# Patient Record
Sex: FEMALE | Race: WHITE | URBAN - METROPOLITAN AREA
[De-identification: names, ages, dates, MRNs, and addresses within clinical notes are randomized per-mention and may not be internally consistent; named-entity substitution may affect disease eponyms.]

---

## 2019-08-14 LAB
CREATININE, EXTERNAL: NORMAL
LDL-C, EXTERNAL: NORMAL

## 2019-09-11 LAB — HBA1C MFR BLD HPLC: NORMAL %

## 2019-11-19 ENCOUNTER — OFFICE VISIT (OUTPATIENT)
Dept: ENDOCRINOLOGY | Age: 64
End: 2019-11-19

## 2019-11-19 VITALS
BODY MASS INDEX: 42.63 KG/M2 | WEIGHT: 271.6 LBS | DIASTOLIC BLOOD PRESSURE: 82 MMHG | SYSTOLIC BLOOD PRESSURE: 119 MMHG | HEIGHT: 67 IN | HEART RATE: 128 BPM

## 2019-11-19 DIAGNOSIS — E11.9 TYPE 2 DIABETES MELLITUS WITHOUT COMPLICATION, WITHOUT LONG-TERM CURRENT USE OF INSULIN (HCC): Primary | ICD-10-CM

## 2019-11-19 DIAGNOSIS — I10 ESSENTIAL HYPERTENSION WITH GOAL BLOOD PRESSURE LESS THAN 130/80: ICD-10-CM

## 2019-11-19 DIAGNOSIS — E78.5 HYPERLIPIDEMIA, UNSPECIFIED HYPERLIPIDEMIA TYPE: ICD-10-CM

## 2019-11-19 RX ORDER — ROSUVASTATIN CALCIUM 10 MG/1
TABLET, COATED ORAL
Refills: 1 | COMMUNITY
Start: 2019-10-15

## 2019-11-19 RX ORDER — ASPIRIN 81 MG/1
TABLET ORAL DAILY
COMMUNITY

## 2019-11-19 RX ORDER — INSULIN DEGLUDEC INJECTION 100 U/ML
INJECTION, SOLUTION SUBCUTANEOUS
Refills: 3 | COMMUNITY
Start: 2019-09-11

## 2019-11-19 NOTE — PROGRESS NOTES
CONSULTATION REQUESTED BY: Valentino Nails, RDCS     REASON FOR CONSULT:  Uncontrolled type 2 diabetes    CHIEF COMPLAINT: evaluation of type 2 diabetes    HISTORY OF PRESENT ILLNESS:   Narendra Manrique is a 75 y.o. female with a PMHx as noted below who presents for evaluation of uncontrolled type 2 diabetes. Diabetes History:  Patient was seen in this clinic long ago, never returned,  Diabetes was diagnosed in 2015, started on metformin,  Family History of diabetes is not certain,  Last K2M prior to initial visit was 12.1%,    Current Home Regimen:  - metformin 1000 mg twice per day  - Tresiba 22 units each evening    Review of home glucose:  Not checking her sugars,  Does not recall the last time she checked her sugars,    Review of most recent diabetes-related labs:  Lab Results   Component Value Date    CWY6OJNN 12.1% 09/11/2019    WLV0UCXM 10.6 07/02/2016    LDLCEXT Total Chol 145, LDL 48 09/11/2019    CREATEXT Cr 0.91, GFR 66 09/11/2019    MALBEXT 17.2 07/02/2016     Lab Key:  [id number] = IA-2 pancreatic islet cell autoantibody  CPEPL = C-peptide level  :EXT = External Lab  GADLT = SCOTT-65 autoantibody   INSUL = Insulin level  MCACR (or MALBEXT) = Urine Microalbumin (or External UM)  B12LT = B12 level    PAST MEDICAL/SURGICAL HISTORY:   Past Medical History:   Diagnosis Date    Diabetes ([address]     Hypertension      Past Surgical History:   Procedure Laterality Date    HX BREAST LUMPECTOMY Left     HX HYSTERECTOMY      HX TONSILLECTOMY      IR CHOLECYSTOSTOMY PERCUTANEOUS         ALLERGIES:   Allergies   Allergen Reactions    Codeine Other (comments)     Tongue numbness    Oxycodone Other (comments)       MEDICATIONS ON ADMISSION:     Current Outpatient Medications:     TRESIBA FLEXTOUCH U-100 100 unit/mL (3 mL) inpn, INJECT 22 UNITS SUBCUTANEOUS DAILY. , Disp: , Rfl: 3    rosuvastatin (CRESTOR) 10 mg tablet, TAKE 1 TABLET BY MOUTH EVERYDAY AT BEDTIME, Disp: , Rfl: 1    aspirin delayed-release 81 mg tablet, Take  by mouth daily. , Disp: , Rfl:     metFORMIN (GLUCOPHAGE) 1,000 mg tablet, TAKE 1 TABLET BY MOUTH TWICE A DAY, Disp: , Rfl: 1    BENICAR HCT 40-25 mg per tablet, TAKE 1 TABLET EVERY DAY, Disp: , Rfl: 1    CHOLECALCIFEROL, VITAMIN D3, (VITAMIN D3 PO), Take 1,000 Units by mouth daily. , Disp: , Rfl:     SOCIAL HISTORY:   Social History     Socioeconomic History    Marital status: UNKNOWN     Spouse name: Not on file    Number of children: Not on file    Years of education: Not on file    Highest education level: Not on file   Occupational History    Not on file   Social Needs    Financial resource strain: Not on file    Food insecurity:     Worry: Not on file     Inability: Not on file    Transportation needs:     Medical: Not on file     Non-medical: Not on file   Tobacco Use    Smoking status: Never Smoker    Smokeless tobacco: Never Used   Substance and Sexual Activity    Alcohol use: Never     Frequency: Never    Drug use: Not on file    Sexual activity: Not on file   Lifestyle    Physical activity:     Days per week: Not on file     Minutes per session: Not on file    Stress: Not on file   Relationships    Social connections:     Talks on phone: Not on file     Gets together: Not on file     Attends Alevism service: Not on file     Active member of club or organization: Not on file     Attends meetings of clubs or organizations: Not on file     Relationship status: Not on file    Intimate partner violence:     Fear of current or ex partner: Not on file     Emotionally abused: Not on file     Physically abused: Not on file     Forced sexual activity: Not on file   Other Topics Concern    Not on file   Social History Narrative    Not on file       FAMILY HISTORY:  Family History   Problem Relation Age of Onset    Kidney Disease Mother     Heart Disease Mother     No Known Problems Sister         half sister    Diabetes Brother     Heart Attack Brother     Stroke Brother  Stroke Maternal Grandmother     No Known Problems Maternal Grandfather        REVIEW OF SYSTEMS: Complete ROS assessed and noted for that which is described above, all else are negative. Eyes: normal  ENT: normal  CVS: normal  Resp: normal  GI: normal  : normal  GYN: normal  Endocrine: normal  Integument: normal  Musculoskeletal: normal  Neuro: normal  Psych: normal      PHYSICAL EXAMINATION:    VITAL SIGNS:  Visit Vitals  /82 (BP 1 Location: Right arm, BP Patient Position: Sitting)   Pulse (!) 128   Ht 5' 7\" (1.702 m)   Wt 271 lb 9.6 oz (123.2 kg)   BMI 42.54 kg/m²       GENERAL: NCAT, Sitting comfortably, NAD  EYES: EOMI, non-icteric, no proptosis  Ear/Nose/Throat: NCAT, no inflammation, no masses  LYMPH NODES: No LAD  CARDIOVASCULAR: S1 S2, RRR, No murmur, 2+ radial pulses  RESPIRATORY: CTA b/l, no wheeze/rales  GASTROINTESTINAL: NT, ND  MUSCULOSKELETAL: Normal ROM, no atrophy  SKIN: warm, no edema/rash/ or other skin changes  NEUROLOGIC: 5/5 power all extremities, no tremor, AAOx3  PSYCHIATRIC: Normal affect, Normal insight and judgement    Diabetic foot exam:     Left Foot:   Visual Exam: normal    Pulse DP: 2+ (normal)   Filament test: normal sensation    Vibratory sensation: Vibratory sensation: normal       Right Foot:   Visual Exam: normal    Pulse DP: 2+ (normal)   Filament test: normal sensation    Vibratory sensation: Vibratory sensation: normal      REVIEW OF LABORATORY AND RADIOLOGY DATA:   Labs and documentation have been reviewed as described above. ASSESSMENT AND PLAN:   Erika Holt is a 75 y.o. female with a PMHx as noted above who presents for evaluation of uncontrolled type 2 diabetes. Problems:  Type 2 diabetes Uncontrolled  Hyperlipidemia  Hypertension    We had the pleasure of reviewing together the basics of diabetes including basic pathophysiology and diabetes care.  We further discussed the importance of checking home glucose regularly and takin all of their scheduled medications in order to have the best possible outcome. I was able to answer any questions they had in clinic today and they are invited to reach me if they have any further questions. Based upon our discussion together today we have decided to make the following changes:    Patient not seen since first evaluation >3 years ago. She did move to [place] but has decided to come up and visit us for her diabetes as it has been poorly controlled. She has requested to trial rybelsus which I suspect would be ok. We reviewed her history, of which she denies any history of pancreatitis or thyroid cancer. Her A1c is very high this will be additional therapy and not a substitute for current meds. We noted that maybe in the future we can cut back on the insulin if her sugars do get better. Prescriptions provided as written scripts as the medication is not available in our chart yet, as it is new. PLAN  Type 2 Diabetes  Medications:  Start Rybelsus each morning before breakfast,   Start 3mg tab for 30 days, then 7mg tab for 30 days, then 14mg tab daily thereafter,   Written paper prescription provided  Continue metformin 1000mg twice daily  Continue Tresiba 22 units each morning before breakfast  Advised to check glucose each morning (fasting)  Provided with glucose log sheets for later review    HTN: BP reviewed, stable   HLD: Fasting lipids reviewed, stable    RTC: I would like to see them back in 3 months. 60 minutes spent together with patient today of which >50% of this time was spent in counseling and coordination of care. Nayana Allred.  [address] [facility]

## 2019-11-19 NOTE — PATIENT INSTRUCTIONS
Start Rybelsus each morning before breakfast,   Start 3mg tab for 30 days, then 7mg tab for 30 days, then 14mg tab daily thereafter    Continue metformin 1000mg twice daily    Continue Tresiba 22 units each morning before breakfast    Advised to check glucose each morning (fasting)    Brandy Peralta. [address]      Please note our new policy, you must arrive to the clinic 15 minutes before your appointment time to allow enough time for proper check-in, adequate time to spend with your doctor, and also to respect the appointment time of the next patient. Not arriving 15 minutes in advance may result in having your appointment rescheduled for the next available day/time.  ----------------------------------------------------------------------------------------------------------------------    Below you will find a glucose log sheet which you can use to record your blood sugars. Without checking and recording what your home glucose levels are, it will be difficult to make any changes to your medication dose, even when significant changes may be needed. Please feel free to use the log below to record your home glucose levels. At the very least, I would like for you to login the entire 2-3 weeks just before your visit so we can make your visit much more productive and beneficial to you. GLUCOSE LOG SHEET:    Date Breakfast Lunch Dinner Bedtime Comments ? GLUCOSE LOG SHEET:    Date Breakfast Lunch Dinner Bedtime Comments ? GLUCOSE LOG SHEET:    Date Breakfast Lunch Dinner Bedtime Comments ?

## 2019-11-20 LAB
ALBUMIN/CREAT UR: 31.5 MG/G CREAT (ref 0–30)
CREAT UR-MCNC: 132.5 MG/DL
MICROALBUMIN UR-MCNC: 41.8 UG/ML

## 2019-12-04 ENCOUNTER — TELEPHONE (OUTPATIENT)
Dept: ENDOCRINOLOGY | Age: 64
End: 2019-12-04

## 2019-12-04 NOTE — TELEPHONE ENCOUNTER
I returned this call and Ms. Roque said that she needed a PA for the Rybelsus because [facility] said they didn't stock the medication and they wouldn't order it for her until they knew that her insurance would pay for it. I asked if she had done any of the 3 things on the card that Dr. Aura Dyer gave her in Clinic regarding getting the script paid for. She said she had not because [facility] told her that it wouldn't matter if she had a card or not, her insurance would need a PA before they would order it. I asked her to do one of the 3 ways described on the card and see if that didn't help her. She said she would do this and then give me a call back if it didn't work.   Dana Rivera

## 2019-12-04 NOTE — TELEPHONE ENCOUNTER
Pt called stated she needs a PA on the Rx RYBELSUS. She stated this is a new prescription Dr. Zheng Meek gave her. She wants this done today as she is out of medicine. She can be reach @ [phone].

## 2019-12-09 ENCOUNTER — TELEPHONE (OUTPATIENT)
Dept: ENDOCRINOLOGY | Age: 64
End: 2019-12-09

## 2019-12-09 NOTE — TELEPHONE ENCOUNTER
Ms. Sky Medel daughter called to let us know that her mother does not have any insurance and the instructions given to her for the Rybelsus won't work for her. She is VERY upset because her mother doesn't have any medication. She wants something called into [facility] in [facility] 45 [phone]. I told her I would pass this information on to Dr. Matthew Diop.   Kylee Puri

## 2019-12-10 RX ORDER — GLIMEPIRIDE 4 MG/1
4 TABLET ORAL
Qty: 90 TAB | Refills: 3 | Status: SHIPPED | OUTPATIENT
Start: 2019-12-10 | End: 2019-12-10 | Stop reason: SDUPTHER

## 2019-12-10 RX ORDER — GLIMEPIRIDE 4 MG/1
4 TABLET ORAL
Qty: 90 TAB | Refills: 3 | Status: SHIPPED | OUTPATIENT
Start: 2019-12-10

## 2019-12-10 NOTE — TELEPHONE ENCOUNTER
There must be some confusion, she does appear to have medicare based on what we have on file, but that is ok let us plan for the following. Continue her metformin and tresiba,  Start glimepiride 4mg each morning before breakfast,   Will add more therapy if needed by next visit,     Jl Brown.  [address] [facility]  [address] [place] [address]

## 2019-12-10 NOTE — TELEPHONE ENCOUNTER
I [facility] this call [facility] and relayed the message from Dr. Columba Jackman. She understood this information.    Luis Britt

## 2019-12-10 NOTE — TELEPHONE ENCOUNTER
Ryan Hodge ( daughter) called and stated that it is urgent that she speaks with you. She stated she has been waiting for a return call.

## 2023-05-22 RX ORDER — ROSUVASTATIN CALCIUM 10 MG/1
TABLET, COATED ORAL
COMMUNITY
Start: 2019-10-15

## 2023-05-22 RX ORDER — ASPIRIN 81 MG/1
TABLET ORAL DAILY
COMMUNITY

## 2023-05-22 RX ORDER — OLMESARTAN MEDOXOMIL AND HYDROCHLOROTHIAZIDE 40/25 40; 25 MG/1; MG/1
1 TABLET ORAL DAILY
COMMUNITY
Start: 2016-08-09

## 2023-05-22 RX ORDER — INSULIN DEGLUDEC INJECTION 100 U/ML
INJECTION, SOLUTION SUBCUTANEOUS
COMMUNITY
Start: 2019-09-11

## 2023-05-22 RX ORDER — GLIMEPIRIDE 4 MG/1
4 TABLET ORAL
COMMUNITY
Start: 2019-12-10

## 2024-05-09 ENCOUNTER — HOSPITAL ENCOUNTER (OUTPATIENT)
Facility: HOSPITAL | Age: 69
Discharge: HOME | End: 2024-05-12

## 2024-05-09 DIAGNOSIS — Z12.31 VISIT FOR SCREENING MAMMOGRAM: ICD-10-CM
